# Patient Record
Sex: MALE | Race: WHITE | NOT HISPANIC OR LATINO | Employment: FULL TIME | URBAN - METROPOLITAN AREA
[De-identification: names, ages, dates, MRNs, and addresses within clinical notes are randomized per-mention and may not be internally consistent; named-entity substitution may affect disease eponyms.]

---

## 2020-09-21 ENCOUNTER — OFFICE VISIT CONVERTED (OUTPATIENT)
Dept: ORTHOPEDIC SURGERY | Facility: CLINIC | Age: 40
End: 2020-09-21
Attending: ORTHOPAEDIC SURGERY

## 2021-05-10 NOTE — H&P
History and Physical      Patient Name: Ranjan Morrissey   Patient ID: [id number]   Sex: Male   YOB: 1980        Visit Date: September 21, 2020    Provider: Vidal De Luna MD   Location: [facility]   Location Address: [address] [place] [address]  [id number]   Location Phone: [phone]          Chief Complaint  · Right Knee Pain      History Of Present Illness  Ranjan Morrissey is a 40 year old /White male who presents today to [facility].      Patient presents today with a chief complaint of right knee pain. Patient states that his knee feels weakened and has been causing him pain, mainly on the medial aspect of his knee. Patient states his knee has been locking up on him as well. Patients PCP referred him to [facility]. Patient states that pain has been bothering him in April/May. Patient has been increasing his running recently and also took a fall back in April and May. Patient has been resting his knee for the time being. When patients knee locks he has to wiggle it around before it snaps back. Patient typically is moving his knee and has to work it out of the lock.               Past Surgical History  Back; Hernia; Joint Surgery         Allergy List  NO KNOWN DRUG ALLERGIES; NO KNOWN DRUG ALLERGIES       Allergies Reconciled  Social History  Alcohol Use (Current some day); lives with children; lives with spouse; .; Recreational Drug Use (Never); Tobacco (Never); Working         Review of Systems  · Constitutional  o Denies  o : fever, chills, weight loss  · Cardiovascular  o Denies  o : chest pain, shortness of breath  · Gastrointestinal  o Denies  o : liver disease, heartburn, nausea, blood in stools  · Genitourinary  o Denies  o : painful urination, blood in urine  · Integument  o Denies  o : rash, itching  · Neurologic  o Denies  o : headache, weakness, loss of consciousness  · Musculoskeletal  o Denies  o :  "painful, swollen joints  · Psychiatric  o Denies  o : drug/alcohol addiction, anxiety, depression      Vitals  Date Time BP Position Site L\R Cuff Size HR RR TEMP (F) WT  HT  BMI kg/m2 BSA m2 O2 Sat HC       09/21/2020 03:49 PM      78 - R   178lbs 8oz 5'  10\" 25.61 2 96 %          Physical Examination  · Constitutional  o Appearance  o : well developed, well-nourished, no obvious deformities present  · Head and Face  o Head  o :   § Inspection  § : normocephalic  o Face  o :   § Inspection  § : no facial lesions  · Eyes  o Conjunctivae  o : conjunctivae normal  o Sclerae  o : sclerae white  · Ears, Nose, Mouth and Throat  o Ears  o :   § External Ears  § : appearance within normal limits  § Hearing  § : intact  o Nose  o :   § External Nose  § : appearance normal  · Neck  o Inspection/Palpation  o : normal appearance  o Range of Motion  o : full range of motion  · Respiratory  o Respiratory Effort  o : breathing unlabored  o Inspection of Chest  o : normal appearance  o Auscultation of Lungs  o : no audible wheezing or rales  · Cardiovascular  o Heart  o : regular rate  · Gastrointestinal  o Abdominal Examination  o : soft and non-tender  · Skin and Subcutaneous Tissue  o General Inspection  o : intact, no rashes  · Psychiatric  o General  o : Alert and oriented x3  o Judgement and Insight  o : judgment and insight intact  o Mood and Affect  o : mood normal, affect appropriate  · Right Knee  o Inspection  o : Sensation grossly intact. Neurovascular intact. Pulses normal. Well tracking patella. No swelling, skin discoloration or atrophy. Full flexion and extension. Negative Apley's. Negative McMurrays. Negative Lachman. Negative anterior drawer and posterior sag. Non-tender medial joint line. Non-tender lateral joint line. Calf supple, non-tender. Good strength in quadriceps, hamstrings, dorsiflexors, and plantar flexors.  · Imaging  o Imaging  o : MRI: medial upper condyle stress " reaction.          Assessment  · Right knee pain, unspecified chronicity     719.46/M25.561  · Medial Upper Condyle Stress Reaction of Right Knee     308.9/F43.0      Plan  · Medications  o Medications have been Reconciled  o Transition of Care or Provider Policy  · Instructions  o Dr. De Luna saw and examined the patient and agrees with plan.   o X-rays reviewed by Dr. De Luna.  o Reviewed the patient's Past Medical, Social, and Family history as well as the ROS at today's visit, no changes.  o Call or return if worsening symptoms.  o Follow Up PRN.  o This note was transcribed by Massiel Pappas. chelo  o Discussed diagnosis and treatment options with the patient. Discussed NSAIDs and taking it easy for the time being and injection. Discussed repeat MRI. Patient opted for to take it easy for the time being and is pain worsens patient will call for an appointment and consider a repeat MRI and injection. Patient will follow-up PRN.             Electronically Signed by: Massiel Pappas-, Other -Author on September 24, 2020 09:36:39 AM  Electronically Co-signed by: Vidal De Luna MD -Reviewer on September 24, 2020 05:14:02 PM

## 2021-05-14 VITALS — HEIGHT: 70 IN | BODY MASS INDEX: 25.56 KG/M2 | WEIGHT: 178.5 LBS | HEART RATE: 78 BPM | OXYGEN SATURATION: 96 %

## 2021-06-16 ENCOUNTER — OFFICE VISIT (OUTPATIENT)
Dept: ORTHOPEDIC SURGERY | Facility: CLINIC | Age: 41
End: 2021-06-16

## 2021-06-16 VITALS — HEART RATE: 57 BPM | HEIGHT: 70 IN | WEIGHT: 184 LBS | BODY MASS INDEX: 26.34 KG/M2 | OXYGEN SATURATION: 97 %

## 2021-06-16 DIAGNOSIS — S82.142A CLOSED FRACTURE OF LEFT TIBIAL PLATEAU, INITIAL ENCOUNTER: Primary | ICD-10-CM

## 2021-06-16 PROCEDURE — 99213 OFFICE O/P EST LOW 20 MIN: CPT | Performed by: PHYSICIAN ASSISTANT
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
## 2021-06-16 NOTE — PATIENT INSTRUCTIONS
Patient given normal knee brace in office today, recommend using with activity.   PWBAT at this time, recommend for 4-6 weeks.   RICE Therapy for associated swelling   Reevaluate at follow up 6 weeks.

## 2021-07-28 ENCOUNTER — OFFICE VISIT (OUTPATIENT)
Dept: ORTHOPEDIC SURGERY | Facility: CLINIC | Age: 41
End: 2021-07-28

## 2021-07-28 VITALS — WEIGHT: 183 LBS | BODY MASS INDEX: 26.2 KG/M2 | HEART RATE: 57 BPM | OXYGEN SATURATION: 92 % | HEIGHT: 70 IN

## 2021-07-28 DIAGNOSIS — S82.142A CLOSED FRACTURE OF LEFT TIBIAL PLATEAU, INITIAL ENCOUNTER: Primary | ICD-10-CM

## 2021-07-28 PROCEDURE — 99212 OFFICE O/P EST SF 10 MIN: CPT | Performed by: PHYSICIAN ASSISTANT

## 2021-07-28 NOTE — PATIENT INSTRUCTIONS
Patient able to increase activity level as tolerable. Follow up, should he regress or fail to improve.
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
